# Patient Record
Sex: FEMALE
[De-identification: names, ages, dates, MRNs, and addresses within clinical notes are randomized per-mention and may not be internally consistent; named-entity substitution may affect disease eponyms.]

---

## 2022-12-21 ENCOUNTER — NURSE TRIAGE (OUTPATIENT)
Dept: OTHER | Facility: CLINIC | Age: 82
End: 2022-12-21

## 2022-12-22 NOTE — TELEPHONE ENCOUNTER
Location of patient: [place]    Subjective: Caller states \"Discharged Sunday from ED\"     Current Symptoms: Was treated in ED on Sunday and was diagnosed with COVID. Was told to come back if worse. Chest is hurting, and cough is looser now. Productive with clear to cream phlegm. Chest pain that is equal on both sides and feels tight. History of asthma. Associated Symptoms: NA    Pain Severity: 4/10; sore; constant, worsening    Temperature: Denies by parent's tactile estimate    What has been tried: States she is taking her prescriptions, 1/2 tablet of Advil PM    Recommended disposition: Go to ED now    Care advice provided, patient verbalizes understanding; denies any other questions or concerns.     Outcome: Patient/caller agrees to calling 911- does not have a ride to get to ED    Reason for Disposition   SEVERE or constant chest pain or pressure  (Exception: Mild central chest pain, present only when coughing.)    Protocols used: Coronavirus (COVID-19) Diagnosed or Suspected-ADULT-